# Patient Record
Sex: MALE | Race: OTHER | HISPANIC OR LATINO
[De-identification: names, ages, dates, MRNs, and addresses within clinical notes are randomized per-mention and may not be internally consistent; named-entity substitution may affect disease eponyms.]

---

## 2022-02-28 ENCOUNTER — APPOINTMENT (OUTPATIENT)
Dept: ENDOCRINOLOGY | Facility: CLINIC | Age: 87
End: 2022-02-28
Payer: COMMERCIAL

## 2022-02-28 VITALS
SYSTOLIC BLOOD PRESSURE: 100 MMHG | BODY MASS INDEX: 29.09 KG/M2 | HEIGHT: 66 IN | WEIGHT: 181 LBS | HEART RATE: 60 BPM | DIASTOLIC BLOOD PRESSURE: 70 MMHG

## 2022-02-28 PROCEDURE — 99072 ADDL SUPL MATRL&STAF TM PHE: CPT

## 2022-02-28 PROCEDURE — 99215 OFFICE O/P EST HI 40 MIN: CPT

## 2022-02-28 NOTE — HISTORY OF PRESENT ILLNESS
[FreeTextEntry1] : 86 y/o M w/ Hx od DM2, HTN, HLD, CAD w/ CHF, CKD stage 4, MAT\par here for initial evaluation and management of diabetes\par generally feels well and endorses no acute complaints.\par reports diagnosis 6 y/a. pt is a tangential historian. reports past hosp when diagnosed. reports no past insulin use. recently started Trulicity 0.75 mg. Is on Jardiance 25 mg PO QD but reports metformin recently stopped "due to worsening kidney function" by cards. Has not seen Renal yet. no recent hospitalizations. monitors FSG BID, endorses occasional FSg ~ 60. is not sure if he is taking a SFU at this time. otherwise denies any f/c, CP, SOB, palpitations, tremors, depressed mood, anxiety, palpitations, n/v, stool/urinary abn, skin/weight changes, heat/cold intolerance, HAs, breast/nipple changes, polyuria/polydipsia/nocturia or other complaints.\par \par

## 2022-02-28 NOTE — ASSESSMENT
[Long Term Vascular Complications] : long term vascular complications of diabetes [Carbohydrate Consistent Diet] : carbohydrate consistent diet [Importance of Diet and Exercise] : importance of diet and exercise to improve glycemic control, achieve weight loss and improve cardiovascular health [Hypoglycemia Management] : hypoglycemia management [Self Monitoring of Blood Glucose] : self monitoring of blood glucose [FreeTextEntry1] : 1) DM2: Uncontrolled, A1C  target of <8%. Natural hx of the disease and importance of treatment targets discussed at length, she verbalized understanding. ADA diet and importance of exercise discussed at length. unclear why metformin was started if SGLT-2 inh is still in use. obtain CMP to determine CKD status. If eGFR is >30, would restart renal dose of metformin, reviewed s/e of jardiance. continue GLP-1 agonist at this time. advised to stop all sulfonylureas. will bring meds on NV. Refer to Nutritionist today. We mansi check microalbumin, lipids and labs on the NV. Discuss vaccines and podiatry/opthalmology referrals on NV\par \par 2) Weight gain:  complicated by DM2. Discussed medical  strategies. Pt would like to try lifestyle modifications and GLP-1 agonist therapy at this time. Reassess on the NV for at least ~5% TBW loss.\par \par 3) Essential HTN: Pt is at goal BP, unclear if taking an ACE inh. Reassess microalbumin prior to the NV. Renal apt in place per pt.\par  \par 4) Dyslipidemia: Pt is on a moderate intensity statin. REassess lipids on the next visit. LDL target <100.\par

## 2022-03-11 ENCOUNTER — OUTPATIENT (OUTPATIENT)
Dept: OUTPATIENT SERVICES | Facility: HOSPITAL | Age: 87
LOS: 1 days | End: 2022-03-11
Payer: MEDICARE

## 2022-03-11 DIAGNOSIS — R06.02 SHORTNESS OF BREATH: ICD-10-CM

## 2022-03-11 DIAGNOSIS — R07.9 CHEST PAIN, UNSPECIFIED: ICD-10-CM

## 2022-03-11 PROCEDURE — 93017 CV STRESS TEST TRACING ONLY: CPT

## 2022-03-11 PROCEDURE — 78452 HT MUSCLE IMAGE SPECT MULT: CPT

## 2022-03-11 PROCEDURE — 93016 CV STRESS TEST SUPVJ ONLY: CPT

## 2022-03-11 PROCEDURE — 93018 CV STRESS TEST I&R ONLY: CPT

## 2022-03-11 PROCEDURE — A9500: CPT

## 2022-03-11 PROCEDURE — 82962 GLUCOSE BLOOD TEST: CPT

## 2022-03-11 PROCEDURE — 78452 HT MUSCLE IMAGE SPECT MULT: CPT | Mod: 26

## 2022-03-11 PROCEDURE — A9505: CPT

## 2022-04-11 ENCOUNTER — APPOINTMENT (OUTPATIENT)
Dept: ENDOCRINOLOGY | Facility: CLINIC | Age: 87
End: 2022-04-11
Payer: COMMERCIAL

## 2022-04-11 VITALS
HEIGHT: 66 IN | BODY MASS INDEX: 28.61 KG/M2 | SYSTOLIC BLOOD PRESSURE: 120 MMHG | HEART RATE: 60 BPM | DIASTOLIC BLOOD PRESSURE: 70 MMHG | WEIGHT: 178 LBS

## 2022-04-11 PROCEDURE — 99072 ADDL SUPL MATRL&STAF TM PHE: CPT

## 2022-04-11 PROCEDURE — 99215 OFFICE O/P EST HI 40 MIN: CPT

## 2022-04-11 NOTE — HISTORY OF PRESENT ILLNESS
[FreeTextEntry1] : 88 y/o M w/ Hx od DM2, HTN, HLD, CAD w/ CHF, CKD stage 4, MAT\par here for initial evaluation and management of diabetes\par generally feels well and endorses no acute complaints.\par reports diagnosis 6 y/a. pt is a tangential historian. reports past hosp when diagnosed. reports no past insulin use. recently started Trulicity 0.75 mg. Is on Jardiance 25 mg PO QD but reports metformin recently stopped "due to worsening kidney function" by cards. Has not seen Renal yet. no recent hospitalizations. monitors FSG BID, endorses occasional FSg ~ 60. is not sure if he is taking a SFU at this time. \par \par 4/2022 Here for /fu, generally feels well and endorses no acute complaints. No interval events since LV. Today reports metformin/glipizide cessation by Renal. eGFR at 30. He does not recall if jardiance was stopped as well. compliant w/ trulicity  w. adequate tolerance\par otherwise denies any f/c, CP, SOB, palpitations, tremors, depressed mood, anxiety, palpitations, n/v, stool/urinary abn, skin/weight changes, heat/cold intolerance, HAs, breast/nipple changes, polyuria/polydipsia/nocturia or other complaints.\par \par

## 2022-04-11 NOTE — ASSESSMENT
[Long Term Vascular Complications] : long term vascular complications of diabetes [Carbohydrate Consistent Diet] : carbohydrate consistent diet [Importance of Diet and Exercise] : importance of diet and exercise to improve glycemic control, achieve weight loss and improve cardiovascular health [Hypoglycemia Management] : hypoglycemia management [Self Monitoring of Blood Glucose] : self monitoring of blood glucose [FreeTextEntry1] : 1) DM2: Uncontrolled, A1C  target of <8%. Natural hx of the disease and importance of treatment targets discussed at length, she verbalized understanding. ADA diet and importance of exercise discussed at length. unclear why metformin was started if SGLT-2 inh is still in use. advised to discuss jardiance continuation w/ renal as eGFR is at 30. hold metformin. continue GLP-1 agonist at this time. advised to stop all sulfonylureas. will bring meds on NV. Refer to Nutritionist today. We mansi check microalbumin, lipids and labs on the NV. Discuss vaccines and podiatry/opthalmology referrals on NV\par \par 2) Weight gain:  complicated by DM2. Discussed medical  strategies. Pt would like to try lifestyle modifications and GLP-1 agonist therapy at this time. Reassess on the NV for at least ~5% TBW loss.\par \par 3) Essential HTN: Pt is at goal BP, unclear if taking an ACE inh. Reassess microalbumin prior to the NV. Renal apt in place per pt.\par  \par 4) Dyslipidemia: Pt is on a moderate intensity statin. REassess lipids on the next visit. LDL target <100.\par

## 2022-07-25 ENCOUNTER — APPOINTMENT (OUTPATIENT)
Dept: ENDOCRINOLOGY | Facility: CLINIC | Age: 87
End: 2022-07-25

## 2022-07-25 VITALS
SYSTOLIC BLOOD PRESSURE: 110 MMHG | HEART RATE: 76 BPM | BODY MASS INDEX: 28.45 KG/M2 | HEIGHT: 66 IN | DIASTOLIC BLOOD PRESSURE: 60 MMHG | WEIGHT: 177 LBS

## 2022-07-25 PROCEDURE — 99072 ADDL SUPL MATRL&STAF TM PHE: CPT

## 2022-07-25 PROCEDURE — 99215 OFFICE O/P EST HI 40 MIN: CPT

## 2022-07-25 NOTE — ASSESSMENT
[FreeTextEntry1] : 1) DM2: Uncontrolled, A1C  target of <8%. Natural hx of the disease and importance of treatment targets discussed at length, she verbalized understanding. ADA diet and importance of exercise discussed at length. SGLT-2 inh is still in use. advised to discuss jardiance continuation w/ renal as eGFR is at 30. hold metformin. continue GLP-1 agonist at this time, reduce t o0.75 mc gweekly. if eGFR remains >30, we will restart renally dosed metformin. advised to stop all sulfonylureas. will bring meds on NV. Refer to Nutritionist today. We mansi check microalbumin, lipids and labs on the NV. Discuss vaccines and podiatry/opthalmology referrals on NV\par \par 2) Weight gain:  complicated by DM2. Discussed medical  strategies. Pt would like to try lifestyle modifications and GLP-1 agonist therapy at this time. Reassess on the NV for at least ~5% TBW loss.\par \par 3) Essential HTN: Pt is at goal BP, unclear if taking an ACE inh. Reassess microalbumin prior to the NV. Renal apt in place per pt.\par  \par 4) Dyslipidemia: Pt is on a moderate intensity statin. REassess lipids on the next visit. LDL target <100.\par  [Long Term Vascular Complications] : long term vascular complications of diabetes [Carbohydrate Consistent Diet] : carbohydrate consistent diet [Importance of Diet and Exercise] : importance of diet and exercise to improve glycemic control, achieve weight loss and improve cardiovascular health [Hypoglycemia Management] : hypoglycemia management [Self Monitoring of Blood Glucose] : self monitoring of blood glucose

## 2022-07-25 NOTE — HISTORY OF PRESENT ILLNESS
[FreeTextEntry1] : 88 y/o M w/ Hx od DM2, HTN, HLD, CAD w/ CHF, CKD stage 4, MAT\par here for initial evaluation and management of diabetes\par generally feels well and endorses no acute complaints.\par reports diagnosis 6 y/a. pt is a tangential historian. reports past hosp when diagnosed. reports no past insulin use. recently started Trulicity 0.75 mg. Is on Jardiance 25 mg PO QD but reports metformin recently stopped "due to worsening kidney function" by cards. Has not seen Renal yet. no recent hospitalizations. monitors FSG BID, endorses occasional FSg ~ 60. is not sure if he is taking a SFU at this time. \par \par 7/2022 Here for /fu, generally feels well and endorses no acute complaints. No interval events since LV. . eGFR improved to 41 as of 7/2022, a1c at 7.1%. tolerating jardiance 25 mg w/o LUTS or UTIs . compliant w/ trulicity  w. adequate tolerance but requests dose reduction 2/2 nausea.\par otherwise denies any f/c, CP, SOB, palpitations, tremors, depressed mood, anxiety, palpitations, n/v, stool/urinary abn, skin/weight changes, heat/cold intolerance, HAs, breast/nipple changes, polyuria/polydipsia/nocturia or other complaints.\par \par

## 2022-11-14 ENCOUNTER — APPOINTMENT (OUTPATIENT)
Dept: ENDOCRINOLOGY | Facility: CLINIC | Age: 87
End: 2022-11-14

## 2022-11-14 VITALS
DIASTOLIC BLOOD PRESSURE: 70 MMHG | BODY MASS INDEX: 28.45 KG/M2 | WEIGHT: 177 LBS | HEIGHT: 66 IN | HEART RATE: 90 BPM | SYSTOLIC BLOOD PRESSURE: 110 MMHG

## 2022-11-14 DIAGNOSIS — I10 ESSENTIAL (PRIMARY) HYPERTENSION: ICD-10-CM

## 2022-11-14 DIAGNOSIS — N18.4 CHRONIC KIDNEY DISEASE, STAGE 4 (SEVERE): ICD-10-CM

## 2022-11-14 DIAGNOSIS — E78.5 HYPERLIPIDEMIA, UNSPECIFIED: ICD-10-CM

## 2022-11-14 DIAGNOSIS — E11.65 TYPE 2 DIABETES MELLITUS WITH HYPERGLYCEMIA: ICD-10-CM

## 2022-11-14 PROCEDURE — 99072 ADDL SUPL MATRL&STAF TM PHE: CPT

## 2022-11-14 PROCEDURE — 99215 OFFICE O/P EST HI 40 MIN: CPT

## 2022-11-14 RX ORDER — EMPAGLIFLOZIN 25 MG/1
25 TABLET, FILM COATED ORAL
Qty: 90 | Refills: 0 | Status: ACTIVE | COMMUNITY
Start: 2021-12-03

## 2022-11-14 RX ORDER — DULAGLUTIDE 0.75 MG/.5ML
0.75 INJECTION, SOLUTION SUBCUTANEOUS
Qty: 3 | Refills: 1 | Status: ACTIVE | COMMUNITY
Start: 2021-11-22 | End: 1900-01-01

## 2022-11-14 NOTE — HISTORY OF PRESENT ILLNESS
[FreeTextEntry1] : 86 y/o M w/ Hx od DM2, HTN, HLD, CAD w/ CHF, CKD stage 4, MAT\par here for initial evaluation and management of diabetes\par generally feels well and endorses no acute complaints.\par reports diagnosis 6 y/a. pt is a tangential historian. reports past hosp when diagnosed. reports no past insulin use. recently started Trulicity 0.75 mg. Is on Jardiance 25 mg PO QD but reports metformin recently stopped "due to worsening kidney function" by cards. Has not seen Renal yet. no recent hospitalizations. monitors FSG BID, endorses occasional FSg ~ 60. is not sure if he is taking a SFU at this time. \par \par 11/2022 Here for /fu, generally feels well and endorses no acute complaints. No interval events since LV. . eGFR improved to 41 as of 7/2022, a1c at 7.1%. tolerating jardiance 25 mg w/o LUTS or UTIs . compliant w/ trulicity  w. adequate tolerance but requests dose reduction 2/2 nausea.\par otherwise denies any f/c, CP, SOB, palpitations, tremors, depressed mood, anxiety, palpitations, n/v, stool/urinary abn, skin/weight changes, heat/cold intolerance, HAs, breast/nipple changes, polyuria/polydipsia/nocturia or other complaints.\par \par

## 2022-11-14 NOTE — ASSESSMENT
[Long Term Vascular Complications] : long term vascular complications of diabetes [Carbohydrate Consistent Diet] : carbohydrate consistent diet [Importance of Diet and Exercise] : importance of diet and exercise to improve glycemic control, achieve weight loss and improve cardiovascular health [Hypoglycemia Management] : hypoglycemia management [Self Monitoring of Blood Glucose] : self monitoring of blood glucose [FreeTextEntry1] : 1) DM2: Uncontrolled, A1C  target of <8%. Natural hx of the disease and importance of treatment targets discussed at length, she verbalized understanding. ADA diet and importance of exercise discussed at length. SGLT-2 inh is still in use. advised to discuss jardiance continuation w/ renal as eGFR is at 30. hold metformin. continue GLP-1 agonist at this time, reduce to 0.75 mc gweekly. if eGFR remains >30, we will restart renally dosed metformin. advised to stop all sulfonylureas. will bring meds on NV. Refer to Nutritionist today. We mansi check microalbumin, lipids and labs on the NV. Discuss vaccines and podiatry/opthalmology referrals on NV\par \par 2) Weight gain:  complicated by DM2. Discussed medical  strategies. Pt would like to try lifestyle modifications and GLP-1 agonist therapy at this time. Reassess on the NV for at least ~5% TBW loss.\par \par 3) Essential HTN: Pt is at goal BP, unclear if taking an ACE inh. Reassess microalbumin prior to the NV. Renal apt in place per pt.\par  \par 4) Dyslipidemia: Pt is on a moderate intensity statin. REassess lipids on the next visit. LDL target <100.\par

## 2022-11-15 LAB — CREAT SPEC-SCNC: 7
